# Patient Record
Sex: MALE | Race: BLACK OR AFRICAN AMERICAN | Employment: OTHER | URBAN - METROPOLITAN AREA
[De-identification: names, ages, dates, MRNs, and addresses within clinical notes are randomized per-mention and may not be internally consistent; named-entity substitution may affect disease eponyms.]

---

## 2019-05-28 ENCOUNTER — HOSPITAL ENCOUNTER (OUTPATIENT)
Dept: ULTRASOUND IMAGING | Age: 54
Discharge: HOME | End: 2019-05-28
Attending: FAMILY MEDICINE
Payer: COMMERCIAL

## 2019-05-28 DIAGNOSIS — R60.0 LOCALIZED EDEMA: ICD-10-CM

## 2019-05-28 PROCEDURE — 93971 EXTREMITY STUDY: CPT

## 2020-03-03 ENCOUNTER — OFFICE VISIT (OUTPATIENT)
Dept: SURGERY | Age: 55
End: 2020-03-03

## 2020-03-03 VITALS
WEIGHT: 315 LBS | SYSTOLIC BLOOD PRESSURE: 130 MMHG | HEART RATE: 109 BPM | TEMPERATURE: 98.8 F | DIASTOLIC BLOOD PRESSURE: 80 MMHG | OXYGEN SATURATION: 98 % | RESPIRATION RATE: 20 BRPM | BODY MASS INDEX: 37.19 KG/M2 | HEIGHT: 77 IN

## 2020-03-03 DIAGNOSIS — R22.2 ABDOMINAL WALL MASS: Primary | ICD-10-CM

## 2020-03-03 PROBLEM — E66.01 OBESITY, MORBID (HCC): Status: ACTIVE | Noted: 2020-03-03

## 2020-03-03 RX ORDER — ALLOPURINOL 300 MG/1
TABLET ORAL DAILY
COMMUNITY

## 2020-03-03 NOTE — PROGRESS NOTES
1. Have you been to the ER, urgent care clinic since your last visit? Hospitalized since your last visit? new patient    2. Have you seen or consulted any other health care providers outside of the [address] [place] [address] since your last visit? Include any pap smears or colon screening.  New patient

## 2020-03-05 NOTE — PROGRESS NOTES
Surgery Consult    Subjective:      Pricilla Rubalcava is a 60 y.o. male who is being seen for abdominal wall mass. Patient has symptoms of bulging, pain, which are made worse with lifting, standing. Symptoms were first noted several months ago. Pain is sharp, intermittent. Lump is reducible. Patient does not have symptoms of chronic constipation, chronic cough, difficulty urinating. Denies fever, chills, nausea, vomiting, chest pain, or wheezing. No prior abdominal surgeries. Patient Active Problem List    Diagnosis Date Noted    Obesity, morbid ([address] 03/03/2020    Abdominal wall mass 03/03/2020      History reviewed. No pertinent past medical history. Past Surgical History:   Procedure Laterality Date    HX ORTHOPAEDIC  2016    knee surgery    HX OTHER SURGICAL  2019    mass removed on chest     History reviewed. No pertinent family history. Social History     Tobacco Use    Smoking status: Current Every Day Smoker     Packs/day: 0.25     Years: 30.00     Pack years: 7.50    Smokeless tobacco: Never Used   Substance Use Topics    Alcohol use: Yes     Comment: 5-10 drinks per week for 40 years      Current Outpatient Medications   Medication Sig    allopurinoL (ZYLOPRIM) 300 mg tablet Take  by mouth daily. No current facility-administered medications for this visit. No Known Allergies     Review of Systems:  A complete review of systems was negative except as noted in the HPI,     Objective:     Visit Vitals  /80   Pulse (!) 109   Temp 98.8 °F (37.1 °C)   Resp 20   Ht 6' 5\" (1.956 m)   Wt 343 lb (155.6 kg)   SpO2 98%   BMI 40.67 kg/m²       Physical Exam:  GENERAL: alert, cooperative, no distress, appears stated age, morbidly obese, EYE: negative, LYMPHATIC: Cervical, supraclavicular nodes normal.  THROAT & NECK: normal, LUNG: clear to auscultation bilaterally, HEART: regular rate and rhythm, S1, S2 normal, no murmur. ABDOMEN: Obese, NABS, non-distended.  Pain with palpation above umbilicus with vague fullness EXTREMITIES:  extremities normal, atraumatic, no cyanosis or edema, SKIN: Normal., NEUROLOGIC: negative. Assessment:     Abdominal wall mass suspicious for epigastric hernia; body habitus precludes definitive findings on physical exam.    Plan:     1. Abdominal wall ultrasound (with and without Valsalva). 2. Follow-up after above. 3. Avoid strenuous activity.

## 2020-03-05 NOTE — PATIENT INSTRUCTIONS
Ultrasound of the Abdomen: About This Test  What is it? An abdominal ultrasound uses reflected sound waves to produce a picture of the organs and blood vessels in the upper belly. These include your liver, gallbladder, spleen, pancreas, kidneys, and major blood vessels like the aorta. The sound waves create a picture on a video monitor. Why is this test done? An ultrasound can be done to:  · Find out what's causing belly pain--a gallstone, for example. · Look at a lump or mass that was found in a prior exam and, in some cases, tell what it is. · Check for problems in the liver and kidneys. · Look for changes in an aortic aneurysm, a bulging section in the wall of the large artery that carries blood out of the heart. · Find fluid in the belly. · Guide needles or other medical instruments in treatment. How can you prepare for the test?  Depending on the part of your belly your doctor will be looking at, you may need to eat a fat-free meal the night before your test. Or you may need to avoid eating for 4 to 12 hours before the test. Your doctor will tell you what to do. What happens before the test?  · You will need to take off any jewelry that might interfere with the ultrasound test.  · You will need to take off your clothes around the area to be scanned. You will get a cloth or paper covering to use during the test.  How is the test done? · You lie down on your back or your side on an exam table. · The doctor or technologist spreads some warm gel on your belly to improve the transmission of the sound waves. A small handheld unit called a transducer is pressed against your belly and is moved back and forth. A picture of the organs and blood vessels can be seen on a video monitor. · You need to lie still. You may be asked to take a breath and hold it for several seconds during the scanning. What else should you know about the test?  · The ultrasound is a very safe procedure.   · There is no discomfort from the test itself. If your belly hurts already, you will feel some pain from the probe being pressed down on it. · You will not hear or feel the sound waves. How long does the test take? · The test will take about 30 minutes. · You may be asked to wait until the radiologist has reviewed the scan. He or she may want to do more ultrasound views of some areas of your belly. What happens after the test?  · You will probably be able to go home right away. · You can go back to your usual activities right away. Follow-up care is a key part of your treatment and safety. Be sure to make and go to all appointments, and call your doctor if you are having problems. It's also a good idea to keep a list of the medicines you take. Ask your doctor when you can expect to have your test results. Where can you learn more? Go to http://sunday-gwendolyn.info/. Enter T376 in the search box to learn more about \"Ultrasound of the Abdomen: About This Test.\"  Current as of: March 28, 2019  Content Version: 12.2  © 5952-9477 [employer]. Care instructions adapted under license by [employer] (which disclaims liability or warranty for this information). If you have questions about a medical condition or this instruction, always ask your healthcare professional. [address] any warranty or liability for your use of this information.

## 2020-03-10 ENCOUNTER — HOSPITAL ENCOUNTER (OUTPATIENT)
Dept: ULTRASOUND IMAGING | Age: 55
Discharge: HOME | End: 2020-03-10
Attending: SURGERY
Payer: COMMERCIAL

## 2020-03-10 DIAGNOSIS — R22.2 ABDOMINAL WALL MASS: ICD-10-CM

## 2020-03-10 PROCEDURE — 76705 ECHO EXAM OF ABDOMEN: CPT

## 2020-03-17 ENCOUNTER — TELEPHONE (OUTPATIENT)
Dept: SURGERY | Age: 55
End: 2020-03-17

## 2020-03-17 NOTE — TELEPHONE ENCOUNTER
I called the patient back and I let him know that Dr Douglas Eng was in clinic today but I will forward this message to him so he can call him with his results. Pt in agreement.

## 2020-03-18 NOTE — TELEPHONE ENCOUNTER
Reviewed abdominal wall ultrasound with patient-he has kadie-umbilical hernia containing loop of bowel (reducible at time of imaging). Reviewed concerns with patient along with postponement of elective surgery scheduling. He will contact me/our office  Or present to Emergency Room if hernia pain worsens, increased difficulty with reduction develops, or rafy incarceration occurs. Patient in agreement all questions answered.

## 2021-03-12 ENCOUNTER — TELEPHONE (OUTPATIENT)
Dept: SURGERY | Age: 56
End: 2021-03-12

## 2021-03-12 NOTE — TELEPHONE ENCOUNTER
Patient called to ask if appointment is needed to have his surgery scheduled. I told him I would check with Dr. Will Bello to get surgery details and find out if a in office or virtual visit would be needed.

## 2021-03-30 ENCOUNTER — VIRTUAL VISIT (OUTPATIENT)
Dept: SURGERY | Age: 56
End: 2021-03-30
Payer: COMMERCIAL

## 2021-03-30 VITALS — WEIGHT: 300 LBS | HEIGHT: 77 IN | BODY MASS INDEX: 35.42 KG/M2

## 2021-03-30 DIAGNOSIS — K43.9 VENTRAL HERNIA WITHOUT OBSTRUCTION OR GANGRENE: Primary | ICD-10-CM

## 2021-03-30 PROCEDURE — 99212 OFFICE O/P EST SF 10 MIN: CPT | Performed by: SURGERY

## 2021-03-30 NOTE — PROGRESS NOTES
1. Have you been to the ER, urgent care clinic since your last visit? Hospitalized since your last visit? No    2. Have you seen or consulted any other health care providers outside of the [address] [place] [address] since your last visit? Include any pap smears or colon screening.  No

## 2021-04-01 PROBLEM — K43.9 VENTRAL HERNIA WITHOUT OBSTRUCTION OR GANGRENE: Status: ACTIVE | Noted: 2021-04-01

## 2021-04-01 NOTE — PATIENT INSTRUCTIONS
Abdominal Hernia Repair: Before Your Surgery What is abdominal hernia repair surgery? An abdominal hernia repair is a type of surgery. It fixes a problem called a hernia. A hernia is a bulge under the skin in your belly. It happens when you have a weak spot in your belly muscles and a piece of your intestines or tissues pokes through your muscles. This can cause pain. You may notice the pain most when you lift something heavy. You can have a hernia near your belly button. Or it may be in a scar from an earlier surgery. To fix it, the doctor will do one of two kinds of surgery. In open surgery, the doctor makes one cut near the hernia. This cut is called an incision. In laparoscopic surgery, the doctor makes several very small incisions and uses a thin, lighted scope and small tools. In either type of surgery, the doctor pushes the bulge back in place, if needed. Then the doctor sews the healthy tissue back together. Often the doctor patches the weak spot with a piece of material. 
Laparoscopic surgery leaves several small scars. Open surgery leaves one long scar. The scars fade with time. You will probably need to take 1 to 2 weeks off from work. But if your job requires heavy lifting or other physical work, you may need to take 4 to 6 weeks off. Follow-up care is a key part of your treatment and safety. Be sure to make and go to all appointments, and call your doctor if you are having problems. It's also a good idea to know your test results and keep a list of the medicines you take. How do you prepare for the surgery? Surgery can be stressful. This information will help you understand what you can expect. And it will help you safely prepare for surgery. Preparing for surgery 
  · Be sure you have someone to take you home.  Anesthesia and pain medicine will make it unsafe for you to drive or get home on your own.  
  · Understand exactly what surgery is planned, along with the risks, benefits, and other options.  
  · Tell your doctor ALL the medicines, vitamins, supplements, and herbal remedies you take. Some may increase the risk of problems during your surgery. Your doctor will tell you if you should stop taking any of them before the surgery and how soon to do it.  
  · If you take aspirin or some other blood thinner, ask your doctor if you should stop taking it before your surgery. Make sure that you understand exactly what your doctor wants you to do. These medicines increase the risk of bleeding.  
  · Make sure your doctor and the hospital have a copy of your advance directive. If you don't have one, you may want to prepare one. It lets others know your health care wishes. It's a good thing to have before any type of surgery or procedure. Domenic Pittman What happens on the day of surgery? · Follow the instructions exactly about when to stop eating and drinking. If you don't, your surgery may be canceled. If your doctor told you to take your medicines on the day of surgery, take them with only a sip of water.  
  · Take a bath or shower before you come in for your surgery. Do not apply lotions, perfumes, deodorants, or nail polish.  
  · Do not shave the surgical site yourself.  
  · Take off all jewelry and piercings. And take out contact lenses, if you wear them. At the hospital or surgery center · Bring a picture ID.  
  · The area for surgery is often marked to make sure there are no errors.  
  · You will be kept comfortable and safe by your anesthesia provider. You will be asleep during the surgery.  
  · The surgery will take 30 minutes to 2 hours. It depends on how large the hernia is and where it is. When should you call your doctor? · You have questions or concerns.  
  · You don't understand how to prepare for your surgery.  
  · You become ill before the surgery (such as fever, flu, or a cold).  
  · You need to reschedule or have changed your mind about having the surgery.   
Where can you learn more? 
Go to http://www.gray.com/ Enter U288 in the search box to learn more about \"Abdominal Hernia Repair: Before Your Surgery. \" Current as of: April 15, 2020               Content Version: 12.8 © 4416-7808 [employer]. Care instructions adapted under license by [employer] (which disclaims liability or warranty for this information). If you have questions about a medical condition or this instruction, always ask your healthcare professional. [address] any warranty or liability for your use of this information.

## 2021-04-01 NOTE — PROGRESS NOTES
I was in the office while conducting this encounter. Consent:  He and/or his healthcare decision maker is aware that this patient-initiated Telehealth encounter is a billable service, with coverage as determined by his insurance carrier. He is aware that he may receive a bill and has provided verbal consent to proceed: Yes    This virtual visit was conducted via [address]. Pursuant to the emergency declaration under the [address] [place] [address] waiver authority and the [employer] Act, this Virtual  Visit was conducted to reduce the patient's risk of exposure to COVID-19 and provide continuity of care for an established patient. Services were provided through a video synchronous discussion virtually to substitute for in-person clinic visit. Due to this being a TeleHealth evaluation, many elements of the physical examination are unable to be assessed. Total Time: minutes: 5-10 minutes. Subjective:      Ramón Linda is a 60 y.o. male presents for reevaluation of ventral hernia. He underwent initial evaluation last year, with complaint of abdominal wall mass. Abdominal ultrasound revealed a ventral hernia containing a loop of bowel without signs of obstruction. Since last spring, he notes increased tenderness in size of the bulge adjacent to his umbilicus. He denies nausea, vomiting, fever, chills, or constipation. Objective:     Visit Vitals  Ht 6' 5\" (1.956 m)   Wt 300 lb (136.1 kg)   BMI 35.57 kg/m²       General:  alert, cooperative, no distress, appears stated age, morbidly obese   Abdomen:  Deferred   Incision:   Deferred     Assessment:     Symptomatic ventral hernia, containing loop of bowel. Symptoms have increased over the last year. I recommended laparoscopic repair of the hernia with mesh as an outpatient procedure.   Technical aspects of procedure reviewed along with risks (to include but not limited to bleeding, wound infection, conversion to open procedure, bowel injury, recurrent hernia). Also reviewed anticipated hospital course, postoperative activity restrictions, and expected results. He understands and desires to proceed with scheduling. All questions answered. Plan:     1. Continue current medications. 2.  Avoid strenuous activity. 3.  We will schedule for laparoscopic ventral hernia repair with mesh. Ronel Droado

## 2021-04-20 ENCOUNTER — HOSPITAL ENCOUNTER (OUTPATIENT)
Dept: PREADMISSION TESTING | Age: 56
Discharge: HOME | End: 2021-04-20
Payer: COMMERCIAL

## 2021-04-20 ENCOUNTER — HOSPITAL ENCOUNTER (OUTPATIENT)
Dept: GENERAL RADIOLOGY | Age: 56
Discharge: HOME | End: 2021-04-20
Attending: SURGERY
Payer: COMMERCIAL

## 2021-04-20 VITALS
DIASTOLIC BLOOD PRESSURE: 80 MMHG | HEIGHT: 77 IN | HEART RATE: 69 BPM | WEIGHT: 315 LBS | BODY MASS INDEX: 37.19 KG/M2 | RESPIRATION RATE: 16 BRPM | SYSTOLIC BLOOD PRESSURE: 158 MMHG | TEMPERATURE: 98.2 F

## 2021-04-20 LAB
ALBUMIN SERPL-MCNC: 4 G/DL (ref 3.5–5)
ALBUMIN/GLOB SERPL: 1.1 {RATIO} (ref 1.1–2.2)
ALP SERPL-CCNC: 66 U/L (ref 45–117)
ALT SERPL-CCNC: 30 U/L (ref 12–78)
ANION GAP SERPL CALC-SCNC: 7 MMOL/L (ref 5–15)
APPEARANCE UR: CLEAR
AST SERPL-CCNC: 23 U/L (ref 15–37)
ATRIAL RATE: 67 BPM
BACTERIA URNS QL MICRO: NEGATIVE /HPF
BASOPHILS # BLD: 0.1 K/UL (ref 0–0.1)
BASOPHILS NFR BLD: 1 % (ref 0–1)
BILIRUB SERPL-MCNC: 0.5 MG/DL (ref 0.2–1)
BILIRUB UR QL: NEGATIVE
BUN SERPL-MCNC: 17 MG/DL (ref 6–20)
BUN/CREAT SERPL: 19 (ref 12–20)
CALCIUM SERPL-MCNC: 9.3 MG/DL (ref 8.5–10.1)
CALCULATED P AXIS, ECG09: 32 DEGREES
CALCULATED R AXIS, ECG10: -13 DEGREES
CALCULATED T AXIS, ECG11: -111 DEGREES
CHLORIDE SERPL-SCNC: 107 MMOL/L (ref 97–108)
CO2 SERPL-SCNC: 26 MMOL/L (ref 21–32)
COLOR UR: NORMAL
CREAT SERPL-MCNC: 0.9 MG/DL (ref 0.7–1.3)
DIAGNOSIS, 93000: NORMAL
DIFFERENTIAL METHOD BLD: ABNORMAL
EOSINOPHIL # BLD: 0.1 K/UL (ref 0–0.4)
EOSINOPHIL NFR BLD: 1 % (ref 0–7)
EPITH CASTS URNS QL MICRO: NORMAL /LPF
ERYTHROCYTE [DISTWIDTH] IN BLOOD BY AUTOMATED COUNT: 15.3 % (ref 11.5–14.5)
GLOBULIN SER CALC-MCNC: 3.7 G/DL (ref 2–4)
GLUCOSE SERPL-MCNC: 111 MG/DL (ref 65–100)
GLUCOSE UR STRIP.AUTO-MCNC: NEGATIVE MG/DL
HCT VFR BLD AUTO: 43.2 % (ref 36.6–50.3)
HGB BLD-MCNC: 13.8 G/DL (ref 12.1–17)
HGB UR QL STRIP: NEGATIVE
HYALINE CASTS URNS QL MICRO: NORMAL /LPF (ref 0–5)
IMM GRANULOCYTES # BLD AUTO: 0 K/UL (ref 0–0.04)
IMM GRANULOCYTES NFR BLD AUTO: 0 % (ref 0–0.5)
KETONES UR QL STRIP.AUTO: NEGATIVE MG/DL
LEUKOCYTE ESTERASE UR QL STRIP.AUTO: NEGATIVE
LYMPHOCYTES # BLD: 2 K/UL (ref 0.8–3.5)
LYMPHOCYTES NFR BLD: 27 % (ref 12–49)
MAGNESIUM SERPL-MCNC: 2.1 MG/DL (ref 1.6–2.4)
MCH RBC QN AUTO: 27.2 PG (ref 26–34)
MCHC RBC AUTO-ENTMCNC: 31.9 G/DL (ref 30–36.5)
MCV RBC AUTO: 85 FL (ref 80–99)
MONOCYTES # BLD: 0.5 K/UL (ref 0–1)
MONOCYTES NFR BLD: 7 % (ref 5–13)
NEUTS SEG # BLD: 4.8 K/UL (ref 1.8–8)
NEUTS SEG NFR BLD: 64 % (ref 32–75)
NITRITE UR QL STRIP.AUTO: NEGATIVE
NRBC # BLD: 0 K/UL (ref 0–0.01)
NRBC BLD-RTO: 0 PER 100 WBC
P-R INTERVAL, ECG05: 170 MS
PH UR STRIP: 5 [PH] (ref 5–8)
PLATELET # BLD AUTO: 225 K/UL (ref 150–400)
PMV BLD AUTO: 10.6 FL (ref 8.9–12.9)
POTASSIUM SERPL-SCNC: 3.8 MMOL/L (ref 3.5–5.1)
PROT SERPL-MCNC: 7.7 G/DL (ref 6.4–8.2)
PROT UR STRIP-MCNC: NEGATIVE MG/DL
Q-T INTERVAL, ECG07: 420 MS
QRS DURATION, ECG06: 92 MS
QTC CALCULATION (BEZET), ECG08: 443 MS
RBC # BLD AUTO: 5.08 M/UL (ref 4.1–5.7)
RBC #/AREA URNS HPF: NORMAL /HPF (ref 0–5)
SODIUM SERPL-SCNC: 140 MMOL/L (ref 136–145)
SP GR UR REFRACTOMETRY: 1.03 (ref 1–1.03)
UA: UC IF INDICATED,UAUC: NORMAL
UROBILINOGEN UR QL STRIP.AUTO: 0.2 EU/DL (ref 0.2–1)
VENTRICULAR RATE, ECG03: 67 BPM
WBC # BLD AUTO: 7.5 K/UL (ref 4.1–11.1)
WBC URNS QL MICRO: NORMAL /HPF (ref 0–4)

## 2021-04-20 PROCEDURE — 93005 ELECTROCARDIOGRAM TRACING: CPT

## 2021-04-20 PROCEDURE — 81001 URINALYSIS AUTO W/SCOPE: CPT

## 2021-04-20 PROCEDURE — 36415 COLL VENOUS BLD VENIPUNCTURE: CPT

## 2021-04-20 PROCEDURE — 80053 COMPREHEN METABOLIC PANEL: CPT

## 2021-04-20 PROCEDURE — 85025 COMPLETE CBC W/AUTO DIFF WBC: CPT

## 2021-04-20 PROCEDURE — 83735 ASSAY OF MAGNESIUM: CPT

## 2021-04-20 PROCEDURE — 71046 X-RAY EXAM CHEST 2 VIEWS: CPT

## 2021-04-20 RX ORDER — LANOLIN ALCOHOL/MO/W.PET/CERES
1 CREAM (GRAM) TOPICAL EVERY OTHER DAY
COMMUNITY

## 2021-04-20 NOTE — PERIOP NOTES
Patient given surgical site infection FAQs handout and hand hygiene tips sheet. Pre-operative instructions reviewed and patient verbalizes understanding of instructions. Patient has been given the opportunity to ask additional questions. Pt given 2 bottles of CHG soap and instructed in use. Pt instructed that they will be scheduled for COVID 19 test 4 days prior to surgery. COVID instruction sheet and instructions given to PT. Pt instructed they must self quarantine between  COVID 19 test and day of surgery. Parking deck instructions  given to patient. Instructions reviewed with patient. PATIENT GIVEN WRITTEN INSTRUCTIONS TO GO TO THE IMAGING CENTER/CANCER CENTER [address] [place] [address] [address] TO HAVE CHEST XRAY COMPLETED. Stop smoking pamphlet given to patient. Pt stated he has received 2 doses of Pfizer Covid 19 vaccine. Spoke with Kacie  in OR and reported height 6'5\" and weight 336 lbs.

## 2021-04-21 ENCOUNTER — DOCUMENTATION ONLY (OUTPATIENT)
Dept: SURGERY | Age: 56
End: 2021-04-21

## 2021-04-21 NOTE — PERIOP NOTES
E-msg sent to Prudence Beckham, nurse to Dr. Misha Kim:   Please note abn. Preop testing/CXR: Possible enlargement of left lobe of thyroid gland with mild mass effect on  the left side of the trachea. Abnormal ekg: sending to anesth. For review. You will be notified if any further workup is required preop. Abnormal ekg and cxr reports e-faxed to PCP: Dr. Tyler Andrew.

## 2021-04-21 NOTE — PROGRESS NOTES
Please note abn. Preop testing/CXR: Possible enlargement of left lobe of thyroid gland with mild mass effect on   the left side of the trachea. Abnormal ekg: sending to anesth. For review. You will be notified if any further workup is required preop. Thank you,   Natanael Cash RN     Dr Misha Kim made aware of CXR.

## 2021-04-26 ENCOUNTER — HOSPITAL ENCOUNTER (OUTPATIENT)
Dept: PREADMISSION TESTING | Age: 56
Discharge: HOME | End: 2021-04-26
Payer: COMMERCIAL

## 2021-04-26 ENCOUNTER — TRANSCRIBE ORDER (OUTPATIENT)
Dept: REGISTRATION | Age: 56
End: 2021-04-26

## 2021-04-26 DIAGNOSIS — Z01.812 PRE-PROCEDURE LAB EXAM: ICD-10-CM

## 2021-04-26 DIAGNOSIS — Z01.812 PRE-PROCEDURE LAB EXAM: Primary | ICD-10-CM

## 2021-04-26 PROCEDURE — U0003 INFECTIOUS AGENT DETECTION BY NUCLEIC ACID (DNA OR RNA); SEVERE ACUTE RESPIRATORY SYNDROME CORONAVIRUS 2 (SARS-COV-2) (CORONAVIRUS DISEASE [COVID-19]), AMPLIFIED PROBE TECHNIQUE, MAKING USE OF HIGH THROUGHPUT TECHNOLOGIES AS DESCRIBED BY CMS-2020-01-R: HCPCS

## 2021-04-27 LAB — SARS-COV-2, COV2NT: NOT DETECTED

## 2021-04-30 ENCOUNTER — HOSPITAL ENCOUNTER (OUTPATIENT)
Age: 56
Setting detail: OUTPATIENT SURGERY
Discharge: HOME | End: 2021-04-30
Attending: SURGERY | Admitting: SURGERY
Payer: COMMERCIAL

## 2021-04-30 ENCOUNTER — ANESTHESIA (OUTPATIENT)
Dept: SURGERY | Age: 56
End: 2021-04-30
Payer: COMMERCIAL

## 2021-04-30 ENCOUNTER — ANESTHESIA EVENT (OUTPATIENT)
Dept: SURGERY | Age: 56
End: 2021-04-30
Payer: COMMERCIAL

## 2021-04-30 VITALS
DIASTOLIC BLOOD PRESSURE: 82 MMHG | OXYGEN SATURATION: 97 % | RESPIRATION RATE: 22 BRPM | TEMPERATURE: 98.6 F | SYSTOLIC BLOOD PRESSURE: 124 MMHG | HEART RATE: 65 BPM

## 2021-04-30 DIAGNOSIS — K43.9 VENTRAL HERNIA WITHOUT OBSTRUCTION OR GANGRENE: ICD-10-CM

## 2021-04-30 PROCEDURE — 77030025927 HC STPLR FIX SECURSTRP J&J -F: Performed by: SURGERY

## 2021-04-30 PROCEDURE — 77030020829: Performed by: SURGERY

## 2021-04-30 PROCEDURE — 74011250636 HC RX REV CODE- 250/636: Performed by: ANESTHESIOLOGY

## 2021-04-30 PROCEDURE — 77030031139 HC SUT VCRL2 J&J -A: Performed by: SURGERY

## 2021-04-30 PROCEDURE — 77030010507 HC ADH SKN DERMBND J&J -B: Performed by: SURGERY

## 2021-04-30 PROCEDURE — 74011000250 HC RX REV CODE- 250: Performed by: SURGERY

## 2021-04-30 PROCEDURE — 77030016151 HC PROTCTR LNS DFOG COVD -B: Performed by: SURGERY

## 2021-04-30 PROCEDURE — C1781 MESH (IMPLANTABLE): HCPCS | Performed by: SURGERY

## 2021-04-30 PROCEDURE — 2709999900 HC NON-CHARGEABLE SUPPLY: Performed by: SURGERY

## 2021-04-30 PROCEDURE — 49652 PR LAP, VENTRAL HERNIA REPAIR,REDUCIBLE: CPT | Performed by: SURGERY

## 2021-04-30 PROCEDURE — 77030008608 HC TRCR ENDOSC SMTH AMR -B: Performed by: SURGERY

## 2021-04-30 PROCEDURE — 76010000149 HC OR TIME 1 TO 1.5 HR: Performed by: SURGERY

## 2021-04-30 PROCEDURE — 74011250637 HC RX REV CODE- 250/637: Performed by: ANESTHESIOLOGY

## 2021-04-30 PROCEDURE — 77030008771 HC TU NG SALEM SUMP -A: Performed by: ANESTHESIOLOGY

## 2021-04-30 PROCEDURE — 77030008684 HC TU ET CUF COVD -B: Performed by: ANESTHESIOLOGY

## 2021-04-30 PROCEDURE — 77030012770 HC TRCR OPT FX AMR -B: Performed by: SURGERY

## 2021-04-30 PROCEDURE — 77030036554: Performed by: SURGERY

## 2021-04-30 PROCEDURE — 74011250636 HC RX REV CODE- 250/636: Performed by: NURSE ANESTHETIST, CERTIFIED REGISTERED

## 2021-04-30 PROCEDURE — 77030002933 HC SUT MCRYL J&J -A: Performed by: SURGERY

## 2021-04-30 PROCEDURE — 74011250636 HC RX REV CODE- 250/636: Performed by: SURGERY

## 2021-04-30 PROCEDURE — 77030040361 HC SLV COMPR DVT MDII -B: Performed by: SURGERY

## 2021-04-30 PROCEDURE — 76060000034 HC ANESTHESIA 1.5 TO 2 HR: Performed by: SURGERY

## 2021-04-30 PROCEDURE — 74011000250 HC RX REV CODE- 250: Performed by: NURSE ANESTHETIST, CERTIFIED REGISTERED

## 2021-04-30 PROCEDURE — 77030002895 HC DEV VASC CLOSR COVD -B: Performed by: SURGERY

## 2021-04-30 PROCEDURE — 74011250636 HC RX REV CODE- 250/636

## 2021-04-30 PROCEDURE — 77030026438 HC STYL ET INTUB CARD -A: Performed by: ANESTHESIOLOGY

## 2021-04-30 PROCEDURE — 76210000016 HC OR PH I REC 1 TO 1.5 HR: Performed by: SURGERY

## 2021-04-30 PROCEDURE — 77030039895 HC SYST SMK EVAC LAP COVD -B: Performed by: SURGERY

## 2021-04-30 PROCEDURE — 77030002924 HC SUT GORTX WLGO -B: Performed by: SURGERY

## 2021-04-30 DEVICE — VENTRALIGHT ST MESH WITH ECHO 2 POSITIONING SYSTEM, 4.5" (11 CM), CIRCLE
Type: IMPLANTABLE DEVICE | Site: ABDOMEN | Status: FUNCTIONAL
Brand: VENTRALIGHT

## 2021-04-30 RX ORDER — SODIUM CHLORIDE 0.9 % (FLUSH) 0.9 %
5-40 SYRINGE (ML) INJECTION EVERY 8 HOURS
Status: DISCONTINUED | OUTPATIENT
Start: 2021-04-30 | End: 2021-04-30 | Stop reason: HOSPADM

## 2021-04-30 RX ORDER — BUPIVACAINE HYDROCHLORIDE 5 MG/ML
50 INJECTION, SOLUTION EPIDURAL; INTRACAUDAL ONCE
Status: COMPLETED | OUTPATIENT
Start: 2021-04-30 | End: 2021-04-30

## 2021-04-30 RX ORDER — HYDROMORPHONE HYDROCHLORIDE 1 MG/ML
0.2 INJECTION, SOLUTION INTRAMUSCULAR; INTRAVENOUS; SUBCUTANEOUS
Status: DISCONTINUED | OUTPATIENT
Start: 2021-04-30 | End: 2021-04-30 | Stop reason: HOSPADM

## 2021-04-30 RX ORDER — FENTANYL CITRATE 50 UG/ML
INJECTION, SOLUTION INTRAMUSCULAR; INTRAVENOUS AS NEEDED
Status: DISCONTINUED | OUTPATIENT
Start: 2021-04-30 | End: 2021-04-30 | Stop reason: HOSPADM

## 2021-04-30 RX ORDER — PHENYLEPHRINE HCL IN 0.9% NACL 0.4MG/10ML
SYRINGE (ML) INTRAVENOUS AS NEEDED
Status: DISCONTINUED | OUTPATIENT
Start: 2021-04-30 | End: 2021-04-30 | Stop reason: HOSPADM

## 2021-04-30 RX ORDER — DEXMEDETOMIDINE HYDROCHLORIDE 100 UG/ML
INJECTION, SOLUTION INTRAVENOUS AS NEEDED
Status: DISCONTINUED | OUTPATIENT
Start: 2021-04-30 | End: 2021-04-30 | Stop reason: HOSPADM

## 2021-04-30 RX ORDER — FENTANYL CITRATE 50 UG/ML
25 INJECTION, SOLUTION INTRAMUSCULAR; INTRAVENOUS
Status: COMPLETED | OUTPATIENT
Start: 2021-04-30 | End: 2021-04-30

## 2021-04-30 RX ORDER — NEOSTIGMINE METHYLSULFATE 1 MG/ML
INJECTION INTRAVENOUS AS NEEDED
Status: DISCONTINUED | OUTPATIENT
Start: 2021-04-30 | End: 2021-04-30 | Stop reason: HOSPADM

## 2021-04-30 RX ORDER — MIDAZOLAM HYDROCHLORIDE 1 MG/ML
1 INJECTION, SOLUTION INTRAMUSCULAR; INTRAVENOUS AS NEEDED
Status: DISCONTINUED | OUTPATIENT
Start: 2021-04-30 | End: 2021-04-30 | Stop reason: HOSPADM

## 2021-04-30 RX ORDER — OXYCODONE AND ACETAMINOPHEN 5; 325 MG/1; MG/1
1 TABLET ORAL AS NEEDED
Status: DISCONTINUED | OUTPATIENT
Start: 2021-04-30 | End: 2021-04-30 | Stop reason: HOSPADM

## 2021-04-30 RX ORDER — SODIUM CHLORIDE, SODIUM LACTATE, POTASSIUM CHLORIDE, CALCIUM CHLORIDE 600; 310; 30; 20 MG/100ML; MG/100ML; MG/100ML; MG/100ML
125 INJECTION, SOLUTION INTRAVENOUS CONTINUOUS
Status: DISCONTINUED | OUTPATIENT
Start: 2021-04-30 | End: 2021-04-30 | Stop reason: HOSPADM

## 2021-04-30 RX ORDER — LIDOCAINE HYDROCHLORIDE 10 MG/ML
0.1 INJECTION, SOLUTION EPIDURAL; INFILTRATION; INTRACAUDAL; PERINEURAL AS NEEDED
Status: DISCONTINUED | OUTPATIENT
Start: 2021-04-30 | End: 2021-04-30 | Stop reason: HOSPADM

## 2021-04-30 RX ORDER — ONDANSETRON 2 MG/ML
4 INJECTION INTRAMUSCULAR; INTRAVENOUS AS NEEDED
Status: DISCONTINUED | OUTPATIENT
Start: 2021-04-30 | End: 2021-04-30 | Stop reason: HOSPADM

## 2021-04-30 RX ORDER — LIDOCAINE HYDROCHLORIDE 20 MG/ML
INJECTION, SOLUTION EPIDURAL; INFILTRATION; INTRACAUDAL; PERINEURAL AS NEEDED
Status: DISCONTINUED | OUTPATIENT
Start: 2021-04-30 | End: 2021-04-30 | Stop reason: HOSPADM

## 2021-04-30 RX ORDER — FENTANYL CITRATE 50 UG/ML
50 INJECTION, SOLUTION INTRAMUSCULAR; INTRAVENOUS AS NEEDED
Status: DISCONTINUED | OUTPATIENT
Start: 2021-04-30 | End: 2021-04-30 | Stop reason: HOSPADM

## 2021-04-30 RX ORDER — MORPHINE SULFATE 2 MG/ML
2 INJECTION, SOLUTION INTRAMUSCULAR; INTRAVENOUS
Status: DISCONTINUED | OUTPATIENT
Start: 2021-04-30 | End: 2021-04-30 | Stop reason: HOSPADM

## 2021-04-30 RX ORDER — SODIUM CHLORIDE 9 MG/ML
1000 INJECTION, SOLUTION INTRAVENOUS CONTINUOUS
Status: DISCONTINUED | OUTPATIENT
Start: 2021-04-30 | End: 2021-04-30 | Stop reason: HOSPADM

## 2021-04-30 RX ORDER — SUCCINYLCHOLINE CHLORIDE 20 MG/ML
INJECTION INTRAMUSCULAR; INTRAVENOUS AS NEEDED
Status: DISCONTINUED | OUTPATIENT
Start: 2021-04-30 | End: 2021-04-30 | Stop reason: HOSPADM

## 2021-04-30 RX ORDER — KETOROLAC TROMETHAMINE 30 MG/ML
INJECTION, SOLUTION INTRAMUSCULAR; INTRAVENOUS AS NEEDED
Status: DISCONTINUED | OUTPATIENT
Start: 2021-04-30 | End: 2021-04-30 | Stop reason: HOSPADM

## 2021-04-30 RX ORDER — EPHEDRINE SULFATE/0.9% NACL/PF 50 MG/5 ML
5 SYRINGE (ML) INTRAVENOUS AS NEEDED
Status: DISCONTINUED | OUTPATIENT
Start: 2021-04-30 | End: 2021-04-30 | Stop reason: HOSPADM

## 2021-04-30 RX ORDER — ONDANSETRON 2 MG/ML
INJECTION INTRAMUSCULAR; INTRAVENOUS AS NEEDED
Status: DISCONTINUED | OUTPATIENT
Start: 2021-04-30 | End: 2021-04-30 | Stop reason: HOSPADM

## 2021-04-30 RX ORDER — MIDAZOLAM HYDROCHLORIDE 1 MG/ML
0.5 INJECTION, SOLUTION INTRAMUSCULAR; INTRAVENOUS
Status: DISCONTINUED | OUTPATIENT
Start: 2021-04-30 | End: 2021-04-30 | Stop reason: HOSPADM

## 2021-04-30 RX ORDER — OXYCODONE AND ACETAMINOPHEN 5; 325 MG/1; MG/1
1 TABLET ORAL
Qty: 25 TAB | Refills: 0 | Status: SHIPPED | OUTPATIENT
Start: 2021-04-30 | End: 2021-05-07

## 2021-04-30 RX ORDER — DIPHENHYDRAMINE HYDROCHLORIDE 50 MG/ML
12.5 INJECTION, SOLUTION INTRAMUSCULAR; INTRAVENOUS AS NEEDED
Status: DISCONTINUED | OUTPATIENT
Start: 2021-04-30 | End: 2021-04-30 | Stop reason: HOSPADM

## 2021-04-30 RX ORDER — ACETAMINOPHEN 325 MG/1
650 TABLET ORAL ONCE
Status: DISCONTINUED | OUTPATIENT
Start: 2021-04-30 | End: 2021-04-30 | Stop reason: HOSPADM

## 2021-04-30 RX ORDER — DEXAMETHASONE SODIUM PHOSPHATE 4 MG/ML
INJECTION, SOLUTION INTRA-ARTICULAR; INTRALESIONAL; INTRAMUSCULAR; INTRAVENOUS; SOFT TISSUE AS NEEDED
Status: DISCONTINUED | OUTPATIENT
Start: 2021-04-30 | End: 2021-04-30 | Stop reason: HOSPADM

## 2021-04-30 RX ORDER — FENTANYL CITRATE 50 UG/ML
INJECTION, SOLUTION INTRAMUSCULAR; INTRAVENOUS
Status: COMPLETED
Start: 2021-04-30 | End: 2021-04-30

## 2021-04-30 RX ORDER — SODIUM CHLORIDE 9 MG/ML
50 INJECTION, SOLUTION INTRAVENOUS CONTINUOUS
Status: DISCONTINUED | OUTPATIENT
Start: 2021-04-30 | End: 2021-04-30 | Stop reason: HOSPADM

## 2021-04-30 RX ORDER — GLYCOPYRROLATE 0.2 MG/ML
INJECTION INTRAMUSCULAR; INTRAVENOUS AS NEEDED
Status: DISCONTINUED | OUTPATIENT
Start: 2021-04-30 | End: 2021-04-30 | Stop reason: HOSPADM

## 2021-04-30 RX ORDER — PROPOFOL 10 MG/ML
INJECTION, EMULSION INTRAVENOUS AS NEEDED
Status: DISCONTINUED | OUTPATIENT
Start: 2021-04-30 | End: 2021-04-30 | Stop reason: HOSPADM

## 2021-04-30 RX ORDER — SODIUM CHLORIDE 0.9 % (FLUSH) 0.9 %
5-40 SYRINGE (ML) INJECTION AS NEEDED
Status: DISCONTINUED | OUTPATIENT
Start: 2021-04-30 | End: 2021-04-30 | Stop reason: HOSPADM

## 2021-04-30 RX ORDER — POLYETHYLENE GLYCOL 3350 17 G/17G
17 POWDER, FOR SOLUTION ORAL DAILY
Qty: 20 PACKET | Refills: 0 | Status: SHIPPED | OUTPATIENT
Start: 2021-04-30 | End: 2021-05-20

## 2021-04-30 RX ORDER — ONDANSETRON 4 MG/1
4 TABLET, ORALLY DISINTEGRATING ORAL
Qty: 10 TAB | Refills: 0 | Status: SHIPPED | OUTPATIENT
Start: 2021-04-30

## 2021-04-30 RX ORDER — ROCURONIUM BROMIDE 10 MG/ML
INJECTION, SOLUTION INTRAVENOUS AS NEEDED
Status: DISCONTINUED | OUTPATIENT
Start: 2021-04-30 | End: 2021-04-30 | Stop reason: HOSPADM

## 2021-04-30 RX ORDER — MIDAZOLAM HYDROCHLORIDE 1 MG/ML
INJECTION, SOLUTION INTRAMUSCULAR; INTRAVENOUS AS NEEDED
Status: DISCONTINUED | OUTPATIENT
Start: 2021-04-30 | End: 2021-04-30 | Stop reason: HOSPADM

## 2021-04-30 RX ADMIN — ROCURONIUM BROMIDE 10 MG: 10 SOLUTION INTRAVENOUS at 07:38

## 2021-04-30 RX ADMIN — ROCURONIUM BROMIDE 40 MG: 10 SOLUTION INTRAVENOUS at 07:46

## 2021-04-30 RX ADMIN — ONDANSETRON 4 MG: 2 INJECTION INTRAMUSCULAR; INTRAVENOUS at 09:42

## 2021-04-30 RX ADMIN — Medication 120 MCG: at 08:24

## 2021-04-30 RX ADMIN — NEOSTIGMINE METHYLSULFATE 5 MG: 1 INJECTION, SOLUTION INTRAVENOUS at 08:47

## 2021-04-30 RX ADMIN — PROPOFOL 50 MG: 10 INJECTION, EMULSION INTRAVENOUS at 07:42

## 2021-04-30 RX ADMIN — GLYCOPYRROLATE 0.8 MG: 0.2 INJECTION, SOLUTION INTRAMUSCULAR; INTRAVENOUS at 08:47

## 2021-04-30 RX ADMIN — FENTANYL CITRATE 25 MCG: 50 INJECTION, SOLUTION INTRAMUSCULAR; INTRAVENOUS at 09:18

## 2021-04-30 RX ADMIN — PROPOFOL 50 MG: 10 INJECTION, EMULSION INTRAVENOUS at 08:06

## 2021-04-30 RX ADMIN — OXYCODONE HYDROCHLORIDE AND ACETAMINOPHEN 1 TABLET: 5; 325 TABLET ORAL at 09:43

## 2021-04-30 RX ADMIN — LIDOCAINE HYDROCHLORIDE 100 MG: 20 INJECTION, SOLUTION EPIDURAL; INFILTRATION; INTRACAUDAL; PERINEURAL at 07:38

## 2021-04-30 RX ADMIN — MIDAZOLAM 2 MG: 1 INJECTION INTRAMUSCULAR; INTRAVENOUS at 07:27

## 2021-04-30 RX ADMIN — SODIUM CHLORIDE, POTASSIUM CHLORIDE, SODIUM LACTATE AND CALCIUM CHLORIDE 125 ML/HR: 600; 310; 30; 20 INJECTION, SOLUTION INTRAVENOUS at 06:56

## 2021-04-30 RX ADMIN — DEXMEDETOMIDINE HYDROCHLORIDE 5 MCG: 100 INJECTION, SOLUTION, CONCENTRATE INTRAVENOUS at 08:10

## 2021-04-30 RX ADMIN — DEXMEDETOMIDINE HYDROCHLORIDE 5 MCG: 100 INJECTION, SOLUTION, CONCENTRATE INTRAVENOUS at 08:15

## 2021-04-30 RX ADMIN — Medication 120 MCG: at 07:57

## 2021-04-30 RX ADMIN — DEXAMETHASONE SODIUM PHOSPHATE 10 MG: 4 INJECTION, SOLUTION INTRAMUSCULAR; INTRAVENOUS at 08:08

## 2021-04-30 RX ADMIN — ONDANSETRON HYDROCHLORIDE 4 MG: 2 INJECTION, SOLUTION INTRAMUSCULAR; INTRAVENOUS at 08:08

## 2021-04-30 RX ADMIN — SODIUM CHLORIDE, POTASSIUM CHLORIDE, SODIUM LACTATE AND CALCIUM CHLORIDE: 600; 310; 30; 20 INJECTION, SOLUTION INTRAVENOUS at 08:35

## 2021-04-30 RX ADMIN — PROPOFOL 50 MG: 10 INJECTION, EMULSION INTRAVENOUS at 07:40

## 2021-04-30 RX ADMIN — PROPOFOL 250 MG: 10 INJECTION, EMULSION INTRAVENOUS at 07:38

## 2021-04-30 RX ADMIN — CEFAZOLIN 3 G: 1 INJECTION, POWDER, FOR SOLUTION INTRAMUSCULAR; INTRAVENOUS; PARENTERAL at 07:49

## 2021-04-30 RX ADMIN — FENTANYL CITRATE 100 MCG: 50 INJECTION, SOLUTION INTRAMUSCULAR; INTRAVENOUS at 07:38

## 2021-04-30 RX ADMIN — FENTANYL CITRATE 25 MCG: 50 INJECTION, SOLUTION INTRAMUSCULAR; INTRAVENOUS at 09:26

## 2021-04-30 RX ADMIN — Medication 120 MCG: at 08:28

## 2021-04-30 RX ADMIN — SUCCINYLCHOLINE CHLORIDE 200 MG: 20 INJECTION, SOLUTION INTRAMUSCULAR; INTRAVENOUS at 07:39

## 2021-04-30 RX ADMIN — KETOROLAC TROMETHAMINE 30 MG: 30 INJECTION, SOLUTION INTRAMUSCULAR; INTRAVENOUS at 08:34

## 2021-04-30 RX ADMIN — DEXMEDETOMIDINE HYDROCHLORIDE 5 MCG: 100 INJECTION, SOLUTION, CONCENTRATE INTRAVENOUS at 08:05

## 2021-04-30 RX ADMIN — FENTANYL CITRATE 25 MCG: 50 INJECTION, SOLUTION INTRAMUSCULAR; INTRAVENOUS at 09:36

## 2021-04-30 RX ADMIN — FENTANYL CITRATE 25 MCG: 50 INJECTION, SOLUTION INTRAMUSCULAR; INTRAVENOUS at 09:11

## 2021-04-30 RX ADMIN — SUCCINYLCHOLINE CHLORIDE 60 MG: 20 INJECTION, SOLUTION INTRAMUSCULAR; INTRAVENOUS at 07:42

## 2021-04-30 RX ADMIN — DEXMEDETOMIDINE HYDROCHLORIDE 5 MCG: 100 INJECTION, SOLUTION, CONCENTRATE INTRAVENOUS at 08:20

## 2021-04-30 NOTE — ANESTHESIA PREPROCEDURE EVALUATION
Relevant Problems   ENDOCRINE   (+) Obesity, morbid ([address]       Anesthetic History   No history of anesthetic complications            Review of Systems / Medical History  Patient summary reviewed, nursing notes reviewed and pertinent labs reviewed    Pulmonary  Within defined limits      Sleep apnea           Neuro/Psych   Within defined limits           Cardiovascular  Within defined limits                     GI/Hepatic/Renal  Within defined limits              Endo/Other  Within defined limits      Morbid obesity and arthritis     Other Findings              Physical Exam    Airway  Mallampati: I  TM Distance: > 6 cm  Neck ROM: normal range of motion   Mouth opening: Normal     Cardiovascular  Regular rate and rhythm,  S1 and S2 normal,  no murmur, click, rub, or gallop             Dental  No notable dental hx       Pulmonary  Breath sounds clear to auscultation               Abdominal  GI exam deferred       Other Findings            Anesthetic Plan    ASA: 3  Anesthesia type: general          Induction: Intravenous  Anesthetic plan and risks discussed with: Patient

## 2021-04-30 NOTE — DISCHARGE INSTRUCTIONS
Patient Education     Future Appointments   Date Time Provider Freedom Diaz   5/17/2021  8:40 AM Nazario Pressley NP [facility] BS AMB     You had toradol, a motrin like product at 8:30. Next dose is after 2:30 pm  Percocet given at 9:45 next dose is 2 pm     Abdominal Hernia Repair: What to Expect at [address] [place] [address]  After surgery to repair your hernia, you are likely to have pain for a few days. You may also feel tired and have less energy than normal. This is common. You should feel better after a few days and will probably feel much better in 7 days. For several weeks you may feel discomfort or pulling in the hernia repair when you move. You may have some bruising around the area of the repair. This is normal.  This care sheet gives you a general idea about how long it will take for you to recover. But each person recovers at a different pace. Follow the steps below to get better as quickly as possible. How can you care for yourself at home? Activity    · Rest when you feel tired. Getting enough sleep will help you recover.     · Try to walk each day. Start by walking a little more than you did the day before. Bit by bit, increase the amount you walk. Walking boosts blood flow and helps prevent pneumonia and constipation.     · If your doctor gives you an abdominal binder to wear, use it as directed. This is an elastic bandage that wraps around your belly and upper hips. It helps support your belly muscles after surgery.     · Avoid strenuous activities, such as biking, jogging, weight lifting, or aerobic exercise, until your doctor says it is okay.     · Avoid lifting anything that would make you strain. This may include heavy grocery bags and milk containers, a heavy briefcase or backpack, cat litter or dog food bags, a vacuum , or a child.     · Ask your doctor when you can drive again.     · Most people are able to return to work within 1 to 2 weeks after surgery.  But if your job requires that you do heavy lifting or strenuous activity, you may need to take 4 to 6 weeks off from work.     · You may shower 24 hours after surgery. Pat the cuts (incisions) dry. Do not take a bath for the first 2 weeks, or until your doctor tells you it is okay.     · Ask your doctor when it is okay for you to have sex. Diet    · You can eat your normal diet. If your stomach is upset, try bland, low-fat foods like plain rice, broiled chicken, toast, and yogurt.     · Drink plenty of fluids (unless your doctor tells you not to).     · You may notice that your bowel movements are not regular right after your surgery. This is common. Avoid constipation and straining with bowel movements. You may want to take a fiber supplement every day. If you have not had a bowel movement after a couple of days, ask your doctor about taking a mild laxative. Medicines    · Your doctor will tell you if and when you can restart your medicines. You will also be given instructions about taking any new medicines.     · If you take aspirin or some other blood thinner, ask your doctor if and when to start taking it again. Make sure that you understand exactly what your doctor wants you to do.     · Be safe with medicines. Take pain medicines exactly as directed. ? If the doctor gave you a prescription medicine for pain, take it as prescribed. ? If you are not taking a prescription pain medicine, ask your doctor if you can take an over-the-counter medicine.     · If your doctor prescribed antibiotics, take them as directed. Do not stop taking them just because you feel better. You need to take the full course of antibiotics.     · If you think your pain medicine is making you sick to your stomach:  ? Take your medicine after meals (unless your doctor has told you not to). ? Ask your doctor for a different pain medicine.    Incision care    · If you have strips of tape on the cut (incision) the doctor made, leave the tape on for a week or until it falls off. Or follow your doctor's instructions for removing the tape.     · If you have staples closing the cut, you will need to visit your doctor in 1 to 2 weeks to have them removed.     · Wash the area daily with warm, soapy water, and pat it dry. Don't use hydrogen peroxide or alcohol, which can slow healing. You may cover the area with a gauze bandage if it weeps or rubs against clothing. Change the bandage every day. Other instructions    · Hold a pillow over your incision when you cough or take deep breaths. This will support your belly and decrease your pain.     · Do breathing exercises at home as instructed by your doctor. This will help prevent pneumonia.     · If you had laparoscopic surgery, you may also have pain in your shoulder. The pain usually lasts about a day or two. Follow-up care is a key part of your treatment and safety. Be sure to make and go to all appointments, and call your doctor if you are having problems. It's also a good idea to know your test results and keep a list of the medicines you take. When should you call for help? Call 911 anytime you think you may need emergency care. For example, call if:    · You passed out (lost consciousness).     · You are short of breath. Call your doctor now or seek immediate medical care if:    · You are sick to your stomach and cannot drink fluids.     · You have signs of a blood clot in your leg (called a deep vein thrombosis), such as:  ? Pain in your calf, back of the knee, thigh, or groin. ? Redness and swelling in your leg or groin.     · You have signs of infection, such as:  ? Increased pain, swelling, warmth, or redness. ? Red streaks leading from the incision. ? Pus draining from the incision. ?  A fever.     · You cannot pass stools or gas.     · You have pain that does not get better after you take pain medicine.     · You have loose stitches, or your incision comes open.     · Bright red blood has soaked through the bandage over your incision. Watch closely for changes in your health, and be sure to contact your doctor if you have any problems. Where can you learn more? Go to http://www.gray.com/  Enter B577 in the search box to learn more about \"Abdominal Hernia Repair: What to Expect at Home. \"  Current as of: April 15, 2020               Content Version: 12.8  © 2006-2021 [employer]. Care instructions adapted under license by [employer] (which disclaims liability or warranty for this information). If you have questions about a medical condition or this instruction, always ask your healthcare professional. [address] any warranty or liability for your use of this information. ______________________________________________________________________    Anesthesia Discharge Instructions    After general anesthesia or intervenous sedation, for 24 hours or while taking prescription Narcotics:  · Limit your activities  · Do not drive or operate hazardous machinery  · If you have not urinated within 8 hours after discharge, please contact your surgeon on call. · Do not make important personal or business decisions  · Do not drink alcoholic beverages    Report the following to your surgeon:  · Excessive pain, swelling, redness or odor of or around the surgical area  · Temperature over 100.5 degrees  · Nausea and vomiting lasting longer than 4 hours or if unable to take medication  · Any signs of decreased circulation or nerve impairment to extremity:  Change in color, persistent numbness, tingling, coldness or increased pain.   · Any questions

## 2021-04-30 NOTE — ANESTHESIA POSTPROCEDURE EVALUATION
Procedure(s):  LAPAROSCOPIC VENTRAL HERNIA REPAIR WITH MESH. general    Anesthesia Post Evaluation      Multimodal analgesia: multimodal analgesia used between 6 hours prior to anesthesia start to PACU discharge  Patient location during evaluation: PACU  Patient participation: complete - patient participated  Level of consciousness: awake  Pain score: 2  Pain management: adequate  Airway patency: patent  Anesthetic complications: no  Cardiovascular status: acceptable  Respiratory status: acceptable  Hydration status: acceptable  Comments: I have evaluated the patient and meets criteria for discharge from PACU. Sofie Orta MD  Post anesthesia nausea and vomiting:  controlled  Final Post Anesthesia Temperature Assessment:  Normothermia (36.0-37.5 degrees C)      INITIAL Post-op Vital signs:   Vitals Value Taken Time   /86 04/30/21 0920   Temp 37 °C (98.6 °F) 04/30/21 0902   Pulse 62 04/30/21 0931   Resp 18 04/30/21 0931   SpO2 98 % 04/30/21 0931   Vitals shown include unvalidated device data.

## 2021-04-30 NOTE — OP NOTES
[facility]– [place]  OPERATIVE REPORT    Name:  Dany Varela  MR#:  [id number]  :  1965  ACCOUNT #:  [de-identified]  DATE OF SERVICE:  2021      PREOPERATIVE DIAGNOSIS:  Ventral hernia. POSTOPERATIVE DIAGNOSIS:  Epigastric hernia. PROCEDURE PERFORMED:  Laparoscopic epigastric hernia repair with mesh (CPT B7447252). SURGEON:  Gabriella Nickerson MD    ASSISTANT:  Tiffanie Baldwin SA    ANESTHESIA:  General endotracheal.    COMPLICATIONS:  None. SPECIMENS REMOVED:  None. IMPLANTS:  11.4-cm round Ventralight ST mesh. ESTIMATED BLOOD LOSS:  20 mL. DRAIN:  None. COUNTS:  Sponge count correct. Needle count correct. INDICATIONS:  The patient is a 78-year-old morbidly obese -American male with a longstanding, progressively enlarging, tender supraumbilical bulge. On exam, he has a tender, full area above the umbilicus. Abdominal wall ultrasound confirms a fascial defect containing a loop of bowel without obstruction. He is taken to the operating room today for laparoscopic repair. FINDINGS:  1.  A 2 cm x 2 cm epigastric fascial defect containing a portion of the falciform ligament, reduced. 2.  Satisfactory mesh repair using 11.4-cm round Ventralight ST mesh. PROCEDURE:  The patient was identified as the correct patient in the preoperative holding area and informed consent was confirmed. After answering the patient's remaining questions, he was taken to the operating room and placed on the operating room table in the supine position. Sequential compression devices were placed on both lower extremities. Following the uneventful initiation of general anesthesia, he was carefully secured to the operating room table with safety strap in place. All potential pressure points were padded with eggcrate. His abdomen was prepped and draped in usual sterile fashion. Final time-out was performed, it was confirmed he had received intravenous antibiotics.   A 5-mm trocar was inserted through a small right-sided skin incision using an Optiview technique. After confirming the peritoneal location of the trocar tip, insufflation with carbon dioxide gas was initiated. Once adequate working sites had been developed, the 5-mm, 30-degree laparoscope was inserted. No signs of trocar injury were present. An epigastric hernia containing the inferior aspect of the falciform ligament was identified. Two additional right-sided trocars, one 12 mm and the other 5 mm, were inserted using visual guidance with the laparoscope. A LigaSure device was used to incise the peritoneum to the right of the fascial defect, allowing entry into the preperitoneal space. This allowed reduction of falciform ligament and preperitoneal fat from the abdominal wall defect. This tissue along with the falciform ligament were further mobilized cephalad using bipolar device and blunt dissection. This exposed a 2 cm x 2 cm defect. An 11.4-cm round Ventralight ST mesh was opened on the back table. The mesh was hydrated, rolled and introduced into the abdominal space. Once pneumoperitoneum had been reestablished, the mesh was centered over the fascial defect using the Echo 2 positioning system. The mesh was circumferentially fixated to the abdominal wall with multiple absorbable tacks. The Echo 2 positioning device was removed, the mesh was further secured to the abdominal wall with additional absorbable tacks and zero He[place]-Natan transfascial sutures placed at the 12 o'clock, 3 o'clock, 6 o'clock, 9 o'clock positions. After confirming satisfactory mesh fixation and hemostasis, the viscera inspected and no signs of bleeding or visceral injury were present. The 12-mm right-sided fascial defect was closed with a 0 Vicryl suture using a laparoscopic suture passer. Pneumoperitoneum was released, and all trocars were removed. All wounds were infiltrated with 0.5% Marcaine without epinephrine.   All skin edges were reapproximated with a combination of subcuticular 4-0 Monocryl suture and Dermabond. The patient tolerated the procedure well. He was extubated in the operating room and transported to the recovery area in stable condition. The attending surgeon, Dr. Antonietta Julio, was scrubbed and present for the entire procedure.       Cindy Morrison MD      [id number]  D:  04/30/2021 9:35  T:  04/30/2021 14:47  JOB #:  [id number]

## 2021-04-30 NOTE — BRIEF OP NOTE
Brief Postoperative Note    Patient: Jennifer Samuel  YOB: 1965  MRN: [id number]    Date of Procedure: 4/30/2021     Pre-Op Diagnosis: VENTRAL HERNIA    Post-Op Diagnosis: EPIGASTRIC HERNIA      Procedure(s):  LAPAROSCOPIC VENTRAL HERNIA REPAIR WITH MESH    Surgeon(s):  Adriane Valentin MD    Surgical Assistant: Surg Asst-1: Christa Siddiqui    Anesthesia: General     Estimated Blood Loss (mL): Minimal    Complications: None    Specimens: * No specimens in log *     Implants:   Implant Name Type Inv. Item Serial No.  Lot No. LRB No. Used Action   MESH SURG DIA4. 5IN CIR W/ ECHO 2 POS SYS VENTRALIGHT - SN/A  MESH SURG DIA4. 5IN CIR W/ ECHO 2 POS SYS VENTRALIGHT N/A BARD DAVOL_WD [id number] N/A 1 Implanted       Drains: * No LDAs found *    Findings: 2 x 2 cm epigastric defect containing falciform ligament, reduced; repair with 11.4 cm round Ventra-Lite ST mesh.     Electronically Signed by Jennifer Bill MD on 4/30/2021 at 8:35 AM

## 2021-04-30 NOTE — ROUTINE PROCESS
Patient: Isabel Gates MRN: [id number]  SSN: [id number]   YOB: 1965  Age: 60 y.o. Sex: male     Patient is status post Procedure(s):  LAPAROSCOPIC VENTRAL HERNIA REPAIR WITH MESH.     Surgeon(s) and Role:     Darron Cronin MD - Primary    Local/Dose/Irrigation:  See eMAR                Peripheral IV 04/30/21 Right Forearm (Active)            Airway - Endotracheal Tube 04/30/21 Oral (Active)                   Dressing/Packing:  Incision 04/30/21 Abdomen-Dressing/Treatment: Other (Comment)(Skin adhesive (dermabond)) (04/30/21 4295)    Splint/Cast:   Other:  Abdominal binder

## 2021-04-30 NOTE — ADDENDUM NOTE
Addendum  created 04/30/21 1113 by Wilian Mclean CRNA    Flowsheet accepted, Intraprocedure Flowsheets edited

## 2021-04-30 NOTE — H&P
Subjective:      Abigail Pruitt is a 60 y.o. male who is being seen for abdominal wall mass. Patient has symptoms of bulging, pain, which are made worse with lifting, standing. Symptoms were first noted last year. Pain is sharp, intermittent. Lump is reducible. Patient does not have symptoms of chronic constipation, chronic cough, difficulty urinating. Denies fever, chills, nausea, vomiting, chest pain, or wheezing. No prior abdominal surgeries. Patient Active Problem List     Diagnosis Date Noted    Obesity, morbid ([address] 03/03/2020    Abdominal wall mass 03/03/2020      History reviewed. No pertinent past medical history. Past Surgical History:   Procedure Laterality Date    HX ORTHOPAEDIC   2016     knee surgery    HX OTHER SURGICAL   2019     mass removed on chest      History reviewed. No pertinent family history. Social History            Tobacco Use    Smoking status: Current Every Day Smoker       Packs/day: 0.25       Years: 30.00       Pack years: 7.50    Smokeless tobacco: Never Used   Substance Use Topics    Alcohol use: Yes       Comment: 5-10 drinks per week for 40 years           Current Outpatient Medications   Medication Sig    allopurinoL (ZYLOPRIM) 300 mg tablet Take  by mouth daily.      No current facility-administered medications for this visit. No Known Allergies     Review of Systems:  A complete review of systems was negative except as noted in the HPI,      Objective:      Visit Vitals  BP (!) 129/9 (BP 1 Location: Left upper arm, BP Patient Position: At rest)   Pulse 73   Temp 97.8 °F (36.6 °C)   Resp 14   SpO2 94%       Physical Exam:  GENERAL: alert, cooperative, no distress, appears stated age, morbidly obese, EYE: negative, LYMPHATIC: Cervical, supraclavicular nodes normal.  THROAT & NECK: normal, LUNG: clear to auscultation bilaterally, HEART: regular rate and rhythm, S1, S2 normal, no murmur. ABDOMEN: Obese, NABS, non-distended.  Pain with palpation above umbilicus with vague fullness. EXTREMITIES:  extremities normal, atraumatic, no cyanosis or edema, SKIN: Normal., NEUROLOGIC: negative.     Abdominal ultrasound: Ventral hernia containing loop of bowel    Assessment:      Symptomatic ventral hernia. He desires laparoscopic repair with mesh. Plan:     Laparoscopic ventral hernia repair with mesh. Technical aspects of procedure reviewed along with risks (to include but not limited to bleeding, wound infection, recurrence, conversion open procedure, visceral injury). Also reviewed anticipated hospital course, postoperative activity restrictions, and expected results. He understands and desires to proceed. All questions answered.

## 2021-05-05 ENCOUNTER — TELEPHONE (OUTPATIENT)
Dept: SURGERY | Age: 56
End: 2021-05-05

## 2021-05-05 NOTE — TELEPHONE ENCOUNTER
I called the patient back and he said he has not had a bowel movement since surgery and he said it is getting painful, he said he never took the pain medication and he has been using the Miralax daily. He said he took 12 ounces of Mag Citrate Monday and repeated it again today, he Did a Fleets enema Sunday and Monday night and he is wanting to know what to do. I told him to repeat the Fleets enema and he could do times 2, take either Dulcolax tabs or MOM as well. I told him it is better if he can take care of this at home but if not he is going to end up in the ED. Pt in agreement and will continue with laxatives and Fleets enemas.

## 2021-05-26 ENCOUNTER — VIRTUAL VISIT (OUTPATIENT)
Dept: SURGERY | Age: 56
End: 2021-05-26
Payer: COMMERCIAL

## 2021-05-26 ENCOUNTER — TELEPHONE (OUTPATIENT)
Dept: SURGERY | Age: 56
End: 2021-05-26

## 2021-05-26 VITALS — BODY MASS INDEX: 37.19 KG/M2 | WEIGHT: 315 LBS | HEIGHT: 77 IN

## 2021-05-26 DIAGNOSIS — Z87.19 S/P HERNIA REPAIR: ICD-10-CM

## 2021-05-26 DIAGNOSIS — Z98.890 S/P HERNIA REPAIR: ICD-10-CM

## 2021-05-26 DIAGNOSIS — Z09 POSTOPERATIVE EXAMINATION: Primary | ICD-10-CM

## 2021-05-26 DIAGNOSIS — K43.9 VENTRAL HERNIA WITHOUT OBSTRUCTION OR GANGRENE: ICD-10-CM

## 2021-05-26 DIAGNOSIS — K59.00 CONSTIPATION, UNSPECIFIED CONSTIPATION TYPE: ICD-10-CM

## 2021-05-26 PROCEDURE — 99024 POSTOP FOLLOW-UP VISIT: CPT | Performed by: NURSE PRACTITIONER

## 2021-05-26 NOTE — PROGRESS NOTES
I was in the office while conducting this encounter. Consent:  He and/or his healthcare decision maker is aware that this patient-initiated Telehealth encounter is a billable service, with coverage as determined by his insurance carrier. He is aware that he may receive a bill and has provided verbal consent to proceed: No - Not billable    This virtual visit was conducted via [employer]. Pursuant to the emergency declaration under the [facility], [facility] waiver authority and the [employer] Act, this Virtual  Visit was conducted to reduce the patient's risk of exposure to COVID-19 and provide continuity of care for an established patient. Services were provided through a video synchronous discussion virtually to substitute for in-person clinic visit. Due to this being a TeleHealth evaluation, many elements of the physical examination are unable to be assessed. Total Time: minutes: 11-20 minutes. Subjective:    Carole Noriega is a 60 y.o. male presents for postop care 26 days following epigastric hernia repair by Dr. Kaylee Mills. Appetite is good. Eating a regular diet  without difficulty. Bowel movements are  constipated. Patient has not had a substantial bowel movement since the surgery and when he takes a laxative, he has liquid bowel movement but thinks there is a ball of stool blocking the colon. He has tried numerous enemas and taken mag citrate. The patient is not having any pain. He has returned to work. Denies fever, nausea, shortness of breath, chest pain, redness at incision site, vomiting and diarrhea. To note, wife is a nurse and patient works in the emergency room. Objective:     Visit Vitals  Ht 6' 5\" (1.956 m)   Wt 330 lb (149.7 kg)   BMI 39.13 kg/m²       General:  alert, no distress.   Wife is present for the exam   Abdomen: non-tender   Incision:   healing well, no drainage, no erythema, no seroma, no swelling, no dehiscence, incisions well approximated   Heart: Deferred    Lungs:  Unlabored       Assessment:     Epigastric hernia status post repair. Doing well postoperatively. Constipation    Plan:     1. Pt is to increase activities as tolerated. No lifting more than 30 to 40 pounds for the next 2 weeks, then resume full activities and lifting  2. Follow-up prn.  3. Wound care discussed - lap sites well healed  4. Constipationrecommend rectal digital disimpaction, senna ordered Dulcolax suppository as well as mag citrate orally. If that does not work, patient will need to present to the emergency room    Mr. Anika Morales has a reminder for a \"due or due soon\" health maintenance. I have asked that he contact his primary care provider for follow-up on this health maintenance. Patient verbalized understanding and agreement.

## 2021-05-26 NOTE — PATIENT INSTRUCTIONS
Abdominal Hernia Repair: What to Expect at Home Your Recovery After surgery to repair your hernia, you are likely to have pain for a few days. You may also feel tired and have less energy than normal. This is common. You should feel better after a few days and will probably feel much better in 7 days. For several weeks you may feel discomfort or pulling in the hernia repair when you move. You may have some bruising around the area of the repair. This is normal. 
This care sheet gives you a general idea about how long it will take for you to recover. But each person recovers at a different pace. Follow the steps below to get better as quickly as possible. How can you care for yourself at home? Activity 
  · Rest when you feel tired. Getting enough sleep will help you recover.  
  · Try to walk each day. Start by walking a little more than you did the day before. Bit by bit, increase the amount you walk. Walking boosts blood flow and helps prevent pneumonia and constipation.  
  · If your doctor gives you an abdominal binder to wear, use it as directed. This is an elastic bandage that wraps around your belly and upper hips. It helps support your belly muscles after surgery.  
  · Avoid strenuous activities, such as biking, jogging, weight lifting, or aerobic exercise, until your doctor says it is okay.  
  · Avoid lifting anything that would make you strain. This may include heavy grocery bags and milk containers, a heavy briefcase or backpack, cat litter or dog food bags, a vacuum , or a child.  
  · Ask your doctor when you can drive again.  
  · Most people are able to return to work within 1 to 2 weeks after surgery. But if your job requires that you do heavy lifting or strenuous activity, you may need to take 4 to 6 weeks off from work.  
  · You may shower 24 to 48 hours after surgery, if your doctor okays it. Pat the cut (incision) dry.  Do not take a bath for the first 2 weeks, or until your doctor tells you it is okay.  
  · Ask your doctor when it is okay for you to have sex. Diet 
  · You can eat your normal diet. If your stomach is upset, try bland, low-fat foods like plain rice, broiled chicken, toast, and yogurt.  
  · Drink plenty of fluids (unless your doctor tells you not to).  
  · You may notice that your bowel movements are not regular right after your surgery. This is common. Avoid constipation and straining with bowel movements. You may want to take a fiber supplement every day. If you have not had a bowel movement after a couple of days, ask your doctor about taking a mild laxative. Medicines 
  · Your doctor will tell you if and when you can restart your medicines. You will also be given instructions about taking any new medicines.  
  · If you take aspirin or some other blood thinner, ask your doctor if and when to start taking it again. Make sure that you understand exactly what your doctor wants you to do.  
  · Be safe with medicines. Take pain medicines exactly as directed. ? If the doctor gave you a prescription medicine for pain, take it as prescribed. ? If you are not taking a prescription pain medicine, ask your doctor if you can take an over-the-counter medicine.  
  · If your doctor prescribed antibiotics, take them as directed. Do not stop taking them just because you feel better. You need to take the full course of antibiotics.  
  · If you think your pain medicine is making you sick to your stomach: 
? Take your medicine after meals (unless your doctor has told you not to). ? Ask your doctor for a different pain medicine. Incision care 
  · If you have strips of tape on the cut (incision) the doctor made, leave the tape on for a week or until it falls off.  Or follow your doctor's instructions for removing the tape.  
  · If you have staples closing the cut, you will need to visit your doctor in 1 to 2 weeks to have them removed.  
  · Wash the area daily with warm, soapy water, and pat it dry. Don't use hydrogen peroxide or alcohol, which can slow healing. You may cover the area with a gauze bandage if it weeps or rubs against clothing. Change the bandage every day. Other instructions 
  · Hold a pillow over your incision when you cough or take deep breaths. This will support your belly and decrease your pain.  
  · Do breathing exercises at home as instructed by your doctor. This will help prevent pneumonia.  
  · If you had laparoscopic surgery, you may also have pain in your shoulder. The pain usually lasts about a day or two. Follow-up care is a key part of your treatment and safety. Be sure to make and go to all appointments, and call your doctor if you are having problems. It's also a good idea to know your test results and keep a list of the medicines you take. When should you call for help? Call 911 anytime you think you may need emergency care. For example, call if: 
  · You passed out (lost consciousness).  
  · You are short of breath. Call your doctor now or seek immediate medical care if: 
  · You are sick to your stomach and cannot drink fluids.  
  · You have signs of a blood clot in your leg (called a deep vein thrombosis), such as: 
? Pain in your calf, back of the knee, thigh, or groin. ? Redness and swelling in your leg or groin.  
  · You have signs of infection, such as: 
? Increased pain, swelling, warmth, or redness. ? Red streaks leading from the incision. ? Pus draining from the incision. ? A fever.  
  · You cannot pass stools or gas.  
  · You have pain that does not get better after you take pain medicine.  
  · You have loose stitches, or your incision comes open.  
  · Bright red blood has soaked through the bandage over your incision. Watch closely for changes in your health, and be sure to contact your doctor if you have any problems. Where can you learn more? Go to http://www.gray.com/ Enter B577 in the search box to learn more about \"Abdominal Hernia Repair: What to Expect at Home. \" Current as of: April 15, 2020               Content Version: 12.8 © 2006-2021 [employer]. Care instructions adapted under license by [employer] (which disclaims liability or warranty for this information). If you have questions about a medical condition or this instruction, always ask your healthcare professional. [place] [address] any warranty or liability for your use of this information.

## 2021-05-26 NOTE — PROGRESS NOTES
1. Have you been to the ER, urgent care clinic since your last visit? Hospitalized since your last visit? No    2. Have you seen or consulted any other health care providers outside of the [address] [place] [address] since your last visit? Include any pap smears or colon screening.  No

## (undated) DEVICE — SURGICAL PROCEDURE KIT GEN LAPAROSCOPY LF

## (undated) DEVICE — TROCAR: Brand: KII® OPTICAL ACCESS SYSTEM

## (undated) DEVICE — STRAP,POSITIONING,KNEE/BODY,FOAM,4X60": Brand: MEDLINE

## (undated) DEVICE — SYSTEM EVAC SMOKE LAPARSCOPIC

## (undated) DEVICE — LAPAROSCOPIC TROCAR SLEEVE/SINGLE USE: Brand: KII® OPTICAL ACCESS SYSTEM

## (undated) DEVICE — REM POLYHESIVE ADULT PATIENT RETURN ELECTRODE: Brand: VALLEYLAB

## (undated) DEVICE — GLOVE SURG SZ 75 L1212IN FNGR THK138MIL BRN LTX FREE

## (undated) DEVICE — SUTURE SZ 0 27IN 5/8 CIR UR-6  TAPER PT VIOLET ABSRB VICRYL J603H

## (undated) DEVICE — SUTURE GORTX SZ 1 L36IN NONABSORBABLE L26MM THX-26 1/2 CIR 0N05B

## (undated) DEVICE — DERMABOND SKIN ADH 0.7ML -- DERMABOND ADVANCED 12/BX

## (undated) DEVICE — COVER LT HNDL PLAS RIG 1 PER PK

## (undated) DEVICE — INFECTION CONTROL KIT SYS

## (undated) DEVICE — BINDER ABD M/L H12IN FOR 46-62IN WHT 4 SLD PNL DSGN HOOP

## (undated) DEVICE — AIR SHEET,LAT,COMFORT GLIDE, BLEND 40X80: Brand: MEDLINE

## (undated) DEVICE — TUBING, SUCTION, 1/4" X 12', STRAIGHT: Brand: MEDLINE

## (undated) DEVICE — 40418 TRENDELENBURG ONE-STEP ARM PROTECTORS LARGE (1 PAIR): Brand: 40418 TRENDELENBURG ONE-STEP ARM PROTECTORS LARGE (1 PAIR)

## (undated) DEVICE — PREP SKN CHLRAPRP APL 26ML STR --

## (undated) DEVICE — VISUALIZATION SYSTEM: Brand: CLEARIFY

## (undated) DEVICE — TROCAR: Brand: KII® SLEEVE

## (undated) DEVICE — NEEDLE HYPO 22GA L1.5IN BLK S STL HUB POLYPR SHLD REG BVL

## (undated) DEVICE — TROCAR SITE CLOSURE DEVICE: Brand: ENDO CLOSE

## (undated) DEVICE — GARMENT,MEDLINE,DVT,INT,CALF,MED, GEN2: Brand: MEDLINE

## (undated) DEVICE — 4-PORT MANIFOLD: Brand: NEPTUNE 2

## (undated) DEVICE — SUTURE MCRYL SZ 4-0 L27IN ABSRB UD L19MM PS-2 1/2 CIR PRIM Y426H

## (undated) DEVICE — STAPLER INT DIA5MM 25 ABSRB STRP FIX DISP FOR HERN MESH

## (undated) DEVICE — DRAPE,UTILTY,TAPE,15X26, 4EA/PK: Brand: MEDLINE